# Patient Record
Sex: MALE | Race: OTHER | Employment: PART TIME | URBAN - METROPOLITAN AREA
[De-identification: names, ages, dates, MRNs, and addresses within clinical notes are randomized per-mention and may not be internally consistent; named-entity substitution may affect disease eponyms.]

---

## 2023-03-22 ENCOUNTER — HOSPITAL ENCOUNTER (EMERGENCY)
Age: 33
Discharge: HOME | End: 2023-03-22
Attending: STUDENT IN AN ORGANIZED HEALTH CARE EDUCATION/TRAINING PROGRAM

## 2023-03-22 ENCOUNTER — APPOINTMENT (OUTPATIENT)
Dept: CT IMAGING | Age: 33
End: 2023-03-22

## 2023-03-22 VITALS
HEIGHT: 65 IN | TEMPERATURE: 97.8 F | RESPIRATION RATE: 20 BRPM | WEIGHT: 122 LBS | HEART RATE: 86 BPM | SYSTOLIC BLOOD PRESSURE: 132 MMHG | BODY MASS INDEX: 20.33 KG/M2 | OXYGEN SATURATION: 99 % | DIASTOLIC BLOOD PRESSURE: 86 MMHG

## 2023-03-22 DIAGNOSIS — S01.81XA LACERATION OF FOREHEAD, INITIAL ENCOUNTER: Primary | ICD-10-CM

## 2023-03-22 PROCEDURE — 90714 TD VACC NO PRESV 7 YRS+ IM: CPT | Performed by: STUDENT IN AN ORGANIZED HEALTH CARE EDUCATION/TRAINING PROGRAM

## 2023-03-22 PROCEDURE — 70486 CT MAXILLOFACIAL W/O DYE: CPT

## 2023-03-22 PROCEDURE — 90471 IMMUNIZATION ADMIN: CPT | Performed by: STUDENT IN AN ORGANIZED HEALTH CARE EDUCATION/TRAINING PROGRAM

## 2023-03-22 PROCEDURE — 12051 INTMD RPR FACE/MM 2.5 CM/<: CPT

## 2023-03-22 PROCEDURE — 99284 EMERGENCY DEPT VISIT MOD MDM: CPT

## 2023-03-22 PROCEDURE — 6360000002 HC RX W HCPCS: Performed by: STUDENT IN AN ORGANIZED HEALTH CARE EDUCATION/TRAINING PROGRAM

## 2023-03-22 RX ADMIN — CLOSTRIDIUM TETANI TOXOID ANTIGEN (FORMALDEHYDE INACTIVATED) AND CORYNEBACTERIUM DIPHTHERIAE TOXOID ANTIGEN (FORMALDEHYDE INACTIVATED) 0.5 ML: 5; 2 INJECTION, SUSPENSION INTRAMUSCULAR at 15:16

## 2023-03-22 ASSESSMENT — PAIN - FUNCTIONAL ASSESSMENT
PAIN_FUNCTIONAL_ASSESSMENT: NONE - DENIES PAIN
PAIN_FUNCTIONAL_ASSESSMENT: NONE - DENIES PAIN

## 2023-03-22 NOTE — DISCHARGE INSTR - COC
Continuity of Care Form    Patient Name: Radha Moses   :  1990  MRN:  [id number]    Admit date:  3/22/2023  Discharge date:  ***    Code Status Order: No Order   Advance Directives:     Admitting Physician:  No admitting provider for patient encounter. PCP: No primary care provider on file. Discharging Nurse: [facility] Unit/Room#: ED-0032/32  Discharging Unit Phone Number: ***    Emergency Contact:   Extended Emergency Contact Information  Primary Emergency Contact: [address] [place] [address] Phone: [phone]  Relation: Other    Past Surgical History:  History reviewed. No pertinent surgical history. Immunization History:   Immunization History   Administered Date(s) Administered    TD 5LF, [place], (age 7y+), IM, 0.5mL 2023       Active Problems: There is no problem list on file for this patient. Isolation/Infection:   Isolation            No Isolation          Patient Infection Status       None to display            Nurse Assessment:  Last Vital Signs: /86   Pulse 86   Temp 97.8 °F (36.6 °C) (Oral)   Resp 20   Ht 5' 5\" (1.651 m)   Wt 122 lb (55.3 kg)   SpO2 99%   BMI 20.30 kg/m²     Last documented pain score (0-10 scale):    Last Weight:   Wt Readings from Last 1 Encounters:   23 122 lb (55.3 kg)     Mental Status:  {IP PT MENTAL STATUS:}    IV Access:  { DENISE IV ACCESS:210531218}    Nursing Mobility/ADLs:  Walking   {[facility] DME YJZE:021221061}  Transfer  {[facility] DME CVCZ:567826919}  Bathing  {[facility] DME YEYU:051583167}  Dressing  {[facility] DME XCVW:929615681}  Toileting  {[facility] DME CGLQ:403090330}  Feeding  {[facility] DME JLXM:830693706}  Med Admin  {[facility] DME HUFX:218621363}  Med Delivery   { DENISE MED Delivery:444238243}    Wound Care Documentation and Therapy:        Elimination:  Continence:    Bowel: {YES / UK:18830}  Bladder: {YES / AV:68997}  Urinary Catheter: {Urinary Catheter:911849574}   Colostomy/Ileostomy/Ileal Conduit: {YES / HW:99777}